# Patient Record
Sex: FEMALE | Race: WHITE | URBAN - METROPOLITAN AREA
[De-identification: names, ages, dates, MRNs, and addresses within clinical notes are randomized per-mention and may not be internally consistent; named-entity substitution may affect disease eponyms.]

---

## 2018-06-20 ENCOUNTER — OFFICE VISIT (OUTPATIENT)
Dept: URBAN - METROPOLITAN AREA CLINIC 23 | Facility: CLINIC | Age: 78
End: 2018-06-20
Payer: COMMERCIAL

## 2018-06-20 DIAGNOSIS — H40.1122 PRIMARY OPEN-ANGLE GLAUCOMA, LEFT EYE, MODERATE STAGE: ICD-10-CM

## 2018-06-20 PROCEDURE — 99213 OFFICE O/P EST LOW 20 MIN: CPT | Performed by: OPTOMETRIST

## 2018-06-20 ASSESSMENT — INTRAOCULAR PRESSURE
OD: 16
OS: 15

## 2018-06-20 NOTE — IMPRESSION/PLAN
Impression: Primary open-angle glaucoma, right eye, moderate stage: H40.1112. Plan: Discussed findings.  Recommend same regimen

## 2018-10-17 ENCOUNTER — OFFICE VISIT (OUTPATIENT)
Dept: URBAN - METROPOLITAN AREA CLINIC 23 | Facility: CLINIC | Age: 78
End: 2018-10-17
Payer: COMMERCIAL

## 2018-10-17 DIAGNOSIS — H40.1112 PRIMARY OPEN-ANGLE GLAUCOMA, RIGHT EYE, MODERATE STAGE: Primary | ICD-10-CM

## 2018-10-17 PROCEDURE — 99213 OFFICE O/P EST LOW 20 MIN: CPT | Performed by: OPTOMETRIST

## 2018-10-17 PROCEDURE — 92083 EXTENDED VISUAL FIELD XM: CPT | Performed by: OPTOMETRIST

## 2018-10-17 ASSESSMENT — INTRAOCULAR PRESSURE
OS: 11
OD: 15

## 2018-10-17 NOTE — IMPRESSION/PLAN
Impression: Primary open-angle glaucoma, left eye, severe stage: E34.5283. Plan: Monitor. If any thinning seen on next OCT, may add a drop. After next visit, pt would like to go back to 6 month visits.

## 2019-01-16 ENCOUNTER — OFFICE VISIT (OUTPATIENT)
Dept: URBAN - METROPOLITAN AREA CLINIC 23 | Facility: CLINIC | Age: 79
End: 2019-01-16
Payer: COMMERCIAL

## 2019-01-16 DIAGNOSIS — H40.1132 PRIMARY OPEN-ANGLE GLAUCOMA, BILATERAL, MODERATE STAGE: ICD-10-CM

## 2019-01-16 PROCEDURE — 92012 INTRM OPH EXAM EST PATIENT: CPT | Performed by: OPTOMETRIST

## 2019-01-16 ASSESSMENT — VISUAL ACUITY
OS: 20/20
OD: 20/40

## 2019-01-16 ASSESSMENT — INTRAOCULAR PRESSURE
OS: 11
OD: 16

## 2019-01-16 ASSESSMENT — KERATOMETRY
OD: 43.75
OS: 44.50

## 2019-01-16 NOTE — IMPRESSION/PLAN
Impression: Age-related nuclear cataract of right eye: H25.11. Plan: Discussed findings. Optional CE consult with OD near. She may try OTC readers and pop lens out OD.

## 2019-01-17 NOTE — IMPRESSION/PLAN
Impression: Primary open-angle glaucoma, bilateral, moderate stage: Q53.6835. Plan: Intraocular pressure well controlled, tolerating medications. Will continue with same regimen.

## 2019-10-04 ENCOUNTER — OFFICE VISIT (OUTPATIENT)
Dept: URBAN - METROPOLITAN AREA CLINIC 23 | Facility: CLINIC | Age: 79
End: 2019-10-04
Payer: COMMERCIAL

## 2019-10-04 DIAGNOSIS — H25.11 AGE-RELATED NUCLEAR CATARACT OF RIGHT EYE: ICD-10-CM

## 2019-10-04 PROCEDURE — 92012 INTRM OPH EXAM EST PATIENT: CPT | Performed by: OPTOMETRIST

## 2019-10-04 ASSESSMENT — INTRAOCULAR PRESSURE
OS: 9
OD: 14

## 2019-10-04 NOTE — IMPRESSION/PLAN
Impression: Primary open-angle glaucoma, bilateral, moderate stage: O21.9781. Plan: Intraocular pressure well controlled, tolerating medications. Will continue with same regimen. Continue latanoprost as prescribed.

## 2019-10-04 NOTE — IMPRESSION/PLAN
Impression: Age-related nuclear cataract of right eye: H25.11. Plan: Discussed findings. Cataract ready for sx. Pt would not like to have sx at this time, due to another sx she will have to do. Aware that cataract will continue to worsen and glasses will not help improve vision. Pt can call to schedule when she's ready. Monitor.

## 2020-01-01 ENCOUNTER — OFFICE VISIT (OUTPATIENT)
Dept: URBAN - METROPOLITAN AREA CLINIC 23 | Facility: CLINIC | Age: 80
End: 2020-01-01
Payer: COMMERCIAL

## 2020-01-01 PROCEDURE — 92133 CPTRZD OPH DX IMG PST SGM ON: CPT | Performed by: OPTOMETRIST

## 2020-01-01 PROCEDURE — 99213 OFFICE O/P EST LOW 20 MIN: CPT | Performed by: OPTOMETRIST

## 2020-01-01 ASSESSMENT — KERATOMETRY
OD: 44.13
OS: 44.75

## 2020-01-01 ASSESSMENT — INTRAOCULAR PRESSURE
OD: 12
OS: 15

## 2020-02-05 ENCOUNTER — OFFICE VISIT (OUTPATIENT)
Dept: URBAN - METROPOLITAN AREA CLINIC 23 | Facility: CLINIC | Age: 80
End: 2020-02-05
Payer: COMMERCIAL

## 2020-02-05 PROCEDURE — 99213 OFFICE O/P EST LOW 20 MIN: CPT | Performed by: OPTOMETRIST

## 2020-02-05 PROCEDURE — 92083 EXTENDED VISUAL FIELD XM: CPT | Performed by: OPTOMETRIST

## 2020-02-05 PROCEDURE — 92133 CPTRZD OPH DX IMG PST SGM ON: CPT | Performed by: OPTOMETRIST

## 2020-02-05 ASSESSMENT — INTRAOCULAR PRESSURE
OD: 21
OS: 14

## 2020-02-05 NOTE — IMPRESSION/PLAN
Impression: Primary open-angle glaucoma, bilateral, moderate stage: X82.8262. Plan: Intraocular pressure well controlled, tolerating medications. Will continue with same regimen. VF and OCT done. Continue latanoprost as prescribed.

## 2020-05-06 ENCOUNTER — OFFICE VISIT (OUTPATIENT)
Dept: URBAN - METROPOLITAN AREA CLINIC 23 | Facility: CLINIC | Age: 80
End: 2020-05-06
Payer: COMMERCIAL

## 2020-05-06 PROCEDURE — 99213 OFFICE O/P EST LOW 20 MIN: CPT | Performed by: OPTOMETRIST

## 2020-05-06 ASSESSMENT — INTRAOCULAR PRESSURE
OS: 13
OD: 13

## 2020-05-06 ASSESSMENT — VISUAL ACUITY: OD: 20/80

## 2020-05-06 NOTE — IMPRESSION/PLAN
Impression: Age-related nuclear cataract, right eye: H25.11. Plan: Cataracts account for the patient's complaints. Patient understands changing glasses will not improve vision. Patient desires to have surgery, recommend surgical consult. Briefly discussed advanced technology options. Consider blended vision with OD as near eye.

## 2020-05-06 NOTE — IMPRESSION/PLAN
Impression: Primary open-angle glaucoma, bilateral, moderate stage: A64.9915. Plan: Intraocular pressure well controlled, tolerating medications. Will continue with same regimen. Continue latanoprost as prescribed. Monitor.

## 2020-05-15 ENCOUNTER — OFFICE VISIT (OUTPATIENT)
Dept: URBAN - METROPOLITAN AREA CLINIC 23 | Facility: CLINIC | Age: 80
End: 2020-05-15
Payer: COMMERCIAL

## 2020-05-15 PROCEDURE — 92004 COMPRE OPH EXAM NEW PT 1/>: CPT | Performed by: OPHTHALMOLOGY

## 2020-05-15 ASSESSMENT — INTRAOCULAR PRESSURE
OS: 13
OD: 14

## 2020-05-15 NOTE — IMPRESSION/PLAN
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 OD. Symptoms: may improve with surgery. Vision: vision affected. Plan: Cataract accounts for patient's complaints. Reviewed risks, benefits, and procedure. Patient desires surgery, schedule ce/iol OD, RL2, standard IOL w/istent, no stefanie, distance refractive target, patient is clear for surgery in [place] [id number].

## 2020-05-15 NOTE — IMPRESSION/PLAN
Impression: Presence of intraocular lens: Z96.1 OS. Plan: Discussed Diagnosis w/ pt. Will continue to monitor with yearly Exams.

## 2020-06-09 ENCOUNTER — PRE-OPERATIVE VISIT (OUTPATIENT)
Dept: URBAN - METROPOLITAN AREA CLINIC 23 | Facility: CLINIC | Age: 80
End: 2020-06-09
Payer: COMMERCIAL

## 2020-06-09 DIAGNOSIS — H25.21 AGE-RELATED CATARACT, MORGAGNIAN TYPE, RIGHT EYE: Primary | ICD-10-CM

## 2020-06-09 PROCEDURE — 92136 OPHTHALMIC BIOMETRY: CPT | Performed by: OPHTHALMOLOGY

## 2020-06-09 ASSESSMENT — PACHYMETRY
OD: 23.63
OD: 2.89
OS: 23.52
OS: 2.89

## 2020-06-17 ENCOUNTER — SURGERY (OUTPATIENT)
Dept: URBAN - METROPOLITAN AREA SURGERY 11 | Facility: SURGERY | Age: 80
End: 2020-06-17
Payer: COMMERCIAL

## 2020-06-17 PROCEDURE — 0376T INSERT ANT SEG AQUEOUS DEVICE; EA ADDTL: CPT | Performed by: OPHTHALMOLOGY

## 2020-06-17 PROCEDURE — 0191T INSERTION OF ANTERIOR SEGMENT AQUEOUS DRAINAGE DEVICE, W/OUT EXTRAOCULAR RESERVO: CPT | Performed by: OPHTHALMOLOGY

## 2020-06-17 PROCEDURE — 66984 XCAPSL CTRC RMVL W/O ECP: CPT | Performed by: OPHTHALMOLOGY

## 2020-06-18 ENCOUNTER — POST-OPERATIVE VISIT (OUTPATIENT)
Dept: URBAN - METROPOLITAN AREA CLINIC 23 | Facility: CLINIC | Age: 80
End: 2020-06-18

## 2020-06-18 DIAGNOSIS — Z09 ENCNTR FOR F/U EXAM AFT TRTMT FOR COND OTH THAN MALIG NEOPLM: Primary | ICD-10-CM

## 2020-06-18 PROCEDURE — 99024 POSTOP FOLLOW-UP VISIT: CPT | Performed by: OPTOMETRIST

## 2020-06-18 ASSESSMENT — INTRAOCULAR PRESSURE
OS: 15
OD: 20

## 2020-06-24 ENCOUNTER — POST-OPERATIVE VISIT (OUTPATIENT)
Dept: URBAN - METROPOLITAN AREA CLINIC 23 | Facility: CLINIC | Age: 80
End: 2020-06-24

## 2020-06-24 DIAGNOSIS — Z48.810 ENCNTR FOR SURGICAL AFTCR FOL SURGERY ON THE SENSE ORGANS: ICD-10-CM

## 2020-06-24 PROCEDURE — 99024 POSTOP FOLLOW-UP VISIT: CPT | Performed by: OPTOMETRIST

## 2020-06-24 ASSESSMENT — INTRAOCULAR PRESSURE
OD: 16
OS: 16

## 2020-06-24 ASSESSMENT — VISUAL ACUITY
OD: 20/25
OS: 20/40

## 2020-07-30 ENCOUNTER — POST-OPERATIVE VISIT (OUTPATIENT)
Dept: URBAN - METROPOLITAN AREA CLINIC 23 | Facility: CLINIC | Age: 80
End: 2020-07-30
Payer: COMMERCIAL

## 2020-07-30 PROCEDURE — 99024 POSTOP FOLLOW-UP VISIT: CPT | Performed by: OPTOMETRIST

## 2020-07-30 ASSESSMENT — VISUAL ACUITY
OD: 20/30
OS: 20/25

## 2020-07-30 ASSESSMENT — INTRAOCULAR PRESSURE
OS: 17
OD: 16

## 2020-11-17 NOTE — IMPRESSION/PLAN
Impression: Primary open-angle glaucoma, left eye, severe stage: C01.6561. Plan: Discussed diagnosis in detail with patient. Discussed treatment options with patient. Reassured patient of current condition and treatment. Consult recommended with Dr. Ranjith Guidry. RNFL OCT preformed and reviewed. RNFL progression OU. Continue with Latanoprost QHS OU.

## 2021-01-01 ENCOUNTER — OFFICE VISIT (OUTPATIENT)
Dept: URBAN - METROPOLITAN AREA CLINIC 24 | Facility: CLINIC | Age: 81
End: 2021-01-01
Payer: COMMERCIAL

## 2021-01-01 ENCOUNTER — OFFICE VISIT (OUTPATIENT)
Dept: URBAN - METROPOLITAN AREA CLINIC 23 | Facility: CLINIC | Age: 81
End: 2021-01-01
Payer: COMMERCIAL

## 2021-01-01 DIAGNOSIS — H02.824 CYSTS OF LEFT UPPER EYELID: ICD-10-CM

## 2021-01-01 DIAGNOSIS — H40.1123 PRIMARY OPEN-ANGLE GLAUCOMA, LEFT EYE, SEVERE STAGE: Primary | ICD-10-CM

## 2021-01-01 DIAGNOSIS — Z96.1 PRESENCE OF INTRAOCULAR LENS: ICD-10-CM

## 2021-01-01 PROCEDURE — 99204 OFFICE O/P NEW MOD 45 MIN: CPT | Performed by: OPTOMETRIST

## 2021-01-01 PROCEDURE — 92133 CPTRZD OPH DX IMG PST SGM ON: CPT | Performed by: OPTOMETRIST

## 2021-01-01 PROCEDURE — 99214 OFFICE O/P EST MOD 30 MIN: CPT | Performed by: OPHTHALMOLOGY

## 2021-01-01 PROCEDURE — 92020 GONIOSCOPY: CPT | Performed by: OPHTHALMOLOGY

## 2021-01-01 PROCEDURE — 76514 ECHO EXAM OF EYE THICKNESS: CPT | Performed by: OPTOMETRIST

## 2021-01-01 PROCEDURE — 92083 EXTENDED VISUAL FIELD XM: CPT | Performed by: OPTOMETRIST

## 2021-01-01 ASSESSMENT — VISUAL ACUITY
OD: 20/30+3
OS: 20/25-2

## 2021-01-01 ASSESSMENT — INTRAOCULAR PRESSURE
OD: 16
OD: 11
OS: 13
OS: 16

## 2021-02-17 NOTE — IMPRESSION/PLAN
Impression: Cysts of left upper eyelid: H02.824. Plan: Patient states that bump has grown over the past three months. Refer to Dr Darinel Lara for removal and biopsy.

## 2021-02-17 NOTE — IMPRESSION/PLAN
Impression: Primary open-angle glaucoma, left eye, severe stage: F13.5184.
-- s/p CE IOL c iStent x2 inf OD - Dr Janie Estrada - 2020 
-- s/p CE IOL OS - 2013 Plan: Pt has Glaucoma    Gonio :    istent x2 inf in cbb / cbb 2+ pg    Pachs:   616/607   Today's IOP :   11, 13      Tmax  :  21, 18 Target IOP low to mid teens Pt denies Fhx of Glaucoma Equal Vision OU   
HVF 24-2 (02/05/2020) OD: Inferior nasal scotoma OS: temporal scotoma C/D:  .9-.9 ou 
OCT: thinning, abnormal (11/17/2020) Pt denies Sulfa Allergy   // Pt denies Lung /Heart dx Plan :
1. Cont:
Latanoprost QHS OU 2. Patient's IOP today is optimal, however because of the amount of damage already incurred recommend lower IOP OS. 3.  Recommend SLT OS The patient is aware of the limitations of SLT , which can lower IOP 2-4mm for 6--12 months. The Patient is aware that SLT cannot improve the vision nor eliminate the need for the topical medications. If on any glaucoma medications, the patient is aware that SLT is not a replacement for the current medical regimen. **Risk level 1
*** 6-8 week Follow up after laser 4.  Schedule SLT OS - 180

## 2021-08-24 NOTE — IMPRESSION/PLAN
Impression: Primary open-angle glaucoma, left eye, severe stage: E35.5588.
-- s/p CE IOL c iStent x2 inf OD - Dr Haydee Schmid - 2020 
-- s/p CE IOL OS - 2013
-- Pachs OD: 630, OS: 611
-- Target IOP low to mid teens -- Pt denies Fohx of GLC
-- Pt denies Sulfa Allergy   // Pt denies Lung /Heart dx  Plan: Updated HVF 24-2 (advanced loss OS) and rnfl oct today (thinning OD<OS) Plan :
1. Cont:
Latanoprost QHS OU Recommend referral to glc team in light of prior recommendation of SLT (Dr Bowles Book 2/17/21), severity of disease, and today's IOP